# Patient Record
Sex: FEMALE | Race: WHITE | NOT HISPANIC OR LATINO | URBAN - METROPOLITAN AREA
[De-identification: names, ages, dates, MRNs, and addresses within clinical notes are randomized per-mention and may not be internally consistent; named-entity substitution may affect disease eponyms.]

---

## 2019-03-04 ENCOUNTER — APPOINTMENT (RX ONLY)
Dept: URBAN - METROPOLITAN AREA CLINIC 47 | Facility: CLINIC | Age: 69
Setting detail: DERMATOLOGY
End: 2019-03-04

## 2019-03-04 DIAGNOSIS — L82.1 OTHER SEBORRHEIC KERATOSIS: ICD-10-CM

## 2019-03-04 DIAGNOSIS — D22 MELANOCYTIC NEVI: ICD-10-CM

## 2019-03-04 DIAGNOSIS — L81.4 OTHER MELANIN HYPERPIGMENTATION: ICD-10-CM

## 2019-03-04 DIAGNOSIS — D18.0 HEMANGIOMA: ICD-10-CM

## 2019-03-04 PROBLEM — D22.9 MELANOCYTIC NEVI, UNSPECIFIED: Status: ACTIVE | Noted: 2019-03-04

## 2019-03-04 PROBLEM — D18.01 HEMANGIOMA OF SKIN AND SUBCUTANEOUS TISSUE: Status: ACTIVE | Noted: 2019-03-04

## 2019-03-04 PROCEDURE — ? COUNSELING

## 2019-03-04 PROCEDURE — 99213 OFFICE O/P EST LOW 20 MIN: CPT

## 2019-03-04 ASSESSMENT — LOCATION SIMPLE DESCRIPTION DERM
LOCATION SIMPLE: LEFT LOWER BACK
LOCATION SIMPLE: RIGHT UPPER BACK
LOCATION SIMPLE: RIGHT FOREHEAD
LOCATION SIMPLE: LEFT UPPER BACK

## 2019-03-04 ASSESSMENT — LOCATION DETAILED DESCRIPTION DERM
LOCATION DETAILED: LEFT INFERIOR UPPER BACK
LOCATION DETAILED: RIGHT INFERIOR UPPER BACK
LOCATION DETAILED: RIGHT FOREHEAD
LOCATION DETAILED: LEFT SUPERIOR LATERAL LOWER BACK

## 2019-03-04 ASSESSMENT — LOCATION ZONE DERM
LOCATION ZONE: FACE
LOCATION ZONE: TRUNK

## 2019-04-08 ENCOUNTER — APPOINTMENT (RX ONLY)
Dept: URBAN - METROPOLITAN AREA CLINIC 47 | Facility: CLINIC | Age: 69
Setting detail: DERMATOLOGY
End: 2019-04-08

## 2019-04-08 DIAGNOSIS — D485 NEOPLASM OF UNCERTAIN BEHAVIOR OF SKIN: ICD-10-CM

## 2019-04-08 PROBLEM — D48.5 NEOPLASM OF UNCERTAIN BEHAVIOR OF SKIN: Status: ACTIVE | Noted: 2019-04-08

## 2019-04-08 PROCEDURE — ? BIOPSY BY SHAVE METHOD

## 2019-04-08 PROCEDURE — 11102 TANGNTL BX SKIN SINGLE LES: CPT

## 2019-04-08 PROCEDURE — ? COUNSELING

## 2019-04-08 PROCEDURE — ? INVENTORY

## 2019-04-08 ASSESSMENT — LOCATION DETAILED DESCRIPTION DERM: LOCATION DETAILED: LEFT DISTAL DORSAL FOREARM

## 2019-04-08 ASSESSMENT — LOCATION SIMPLE DESCRIPTION DERM: LOCATION SIMPLE: LEFT FOREARM

## 2019-04-08 ASSESSMENT — LOCATION ZONE DERM: LOCATION ZONE: ARM

## 2019-04-08 NOTE — PROCEDURE: BIOPSY BY SHAVE METHOD
Was A Bandage Applied: Yes
Silver Nitrate Text: The wound bed was treated with silver nitrate after the biopsy was performed.
Hemostasis: Drysol
Dressing: bandage
Post-Care Instructions: I reviewed with the patient in detail post-care instructions. Patient is to keep the biopsy site dry overnight, and then apply bacitracin twice daily until healed. Patient may apply hydrogen peroxide soaks to remove any crusting.
Destruction After The Procedure: No
Biopsy Method: Dermablade
Curettage Text: The wound bed was treated with curettage after the biopsy was performed.
Notification Instructions: Patient will be notified of biopsy results. However, patient instructed to call the office if not contacted within 2 weeks.
Detail Level: Detailed
Consent: Written consent was obtained and risks were reviewed including but not limited to scarring, infection, bleeding, scabbing, incomplete removal, nerve damage and allergy to anesthesia.
Lab: -[id number]
Depth Of Biopsy: dermis
Electrodesiccation Text: The wound bed was treated with electrodesiccation after the biopsy was performed.
Anesthesia Type: 1% lidocaine with epinephrine
Lab Facility: [id number]
Cryotherapy Text: The wound bed was treated with cryotherapy after the biopsy was performed.
Size Of Lesion In Cm: 0.3
Wound Care: Petrolatum
Additional Anesthesia Volume In Cc (Will Not Render If 0): 0
Billing Type: Third-Party Bill
Biopsy Type: H and E
Electrodesiccation And Curettage Text: The wound bed was treated with electrodesiccation and curettage after the biopsy was performed.
Type Of Destruction Used: Curettage
Anesthesia Volume In Cc (Will Not Render If 0): 0.5

## 2019-05-03 ENCOUNTER — APPOINTMENT (RX ONLY)
Dept: URBAN - METROPOLITAN AREA CLINIC 47 | Facility: CLINIC | Age: 69
Setting detail: DERMATOLOGY
End: 2019-05-03

## 2019-05-03 DIAGNOSIS — L82.0 INFLAMED SEBORRHEIC KERATOSIS: ICD-10-CM

## 2019-05-03 PROBLEM — C44.629 SQUAMOUS CELL CARCINOMA OF SKIN OF LEFT UPPER LIMB, INCLUDING SHOULDER: Status: ACTIVE | Noted: 2019-05-03

## 2019-05-03 PROCEDURE — ? INVENTORY

## 2019-05-03 PROCEDURE — ? PRESCRIPTION

## 2019-05-03 PROCEDURE — ? LIQUID NITROGEN

## 2019-05-03 PROCEDURE — ? EXCISION

## 2019-05-03 PROCEDURE — 11603 EXC TR-EXT MAL+MARG 2.1-3 CM: CPT | Mod: 59

## 2019-05-03 PROCEDURE — ? COUNSELING

## 2019-05-03 PROCEDURE — 17110 DESTRUCTION B9 LES UP TO 14: CPT

## 2019-05-03 PROCEDURE — 12031 INTMD RPR S/A/T/EXT 2.5 CM/<: CPT | Mod: 59

## 2019-05-03 RX ORDER — MUPIROCIN 20 MG/G
OINTMENT TOPICAL
Qty: 1 | Refills: 3 | Status: ERX | COMMUNITY
Start: 2019-05-03

## 2019-05-03 RX ADMIN — MUPIROCIN: 20 OINTMENT TOPICAL at 00:00

## 2019-05-03 ASSESSMENT — LOCATION DETAILED DESCRIPTION DERM: LOCATION DETAILED: RIGHT DISTAL PRETIBIAL REGION

## 2019-05-03 ASSESSMENT — LOCATION SIMPLE DESCRIPTION DERM: LOCATION SIMPLE: RIGHT PRETIBIAL REGION

## 2019-05-03 ASSESSMENT — LOCATION ZONE DERM: LOCATION ZONE: LEG

## 2019-05-17 ENCOUNTER — APPOINTMENT (RX ONLY)
Dept: URBAN - METROPOLITAN AREA CLINIC 47 | Facility: CLINIC | Age: 69
Setting detail: DERMATOLOGY
End: 2019-05-17

## 2019-05-17 DIAGNOSIS — D485 NEOPLASM OF UNCERTAIN BEHAVIOR OF SKIN: ICD-10-CM

## 2019-05-17 DIAGNOSIS — Z48.02 ENCOUNTER FOR REMOVAL OF SUTURES: ICD-10-CM

## 2019-05-17 PROBLEM — D48.5 NEOPLASM OF UNCERTAIN BEHAVIOR OF SKIN: Status: ACTIVE | Noted: 2019-05-17

## 2019-05-17 PROCEDURE — ? BIOPSY BY SHAVE METHOD

## 2019-05-17 PROCEDURE — ? COUNSELING

## 2019-05-17 PROCEDURE — ? SUTURE REMOVAL (GLOBAL PERIOD)

## 2019-05-17 PROCEDURE — 99024 POSTOP FOLLOW-UP VISIT: CPT

## 2019-05-17 PROCEDURE — ? INVENTORY

## 2019-05-17 PROCEDURE — 11102 TANGNTL BX SKIN SINGLE LES: CPT

## 2019-05-17 ASSESSMENT — LOCATION DETAILED DESCRIPTION DERM
LOCATION DETAILED: LEFT DISTAL DORSAL FOREARM
LOCATION DETAILED: RIGHT PROXIMAL PRETIBIAL REGION

## 2019-05-17 ASSESSMENT — LOCATION ZONE DERM
LOCATION ZONE: ARM
LOCATION ZONE: LEG

## 2019-05-17 ASSESSMENT — LOCATION SIMPLE DESCRIPTION DERM
LOCATION SIMPLE: LEFT FOREARM
LOCATION SIMPLE: RIGHT PRETIBIAL REGION

## 2019-05-17 NOTE — PROCEDURE: SUTURE REMOVAL (GLOBAL PERIOD)
Detail Level: Detailed
Add 09158 Cpt? (Important Note: In 2017 The Use Of 98899 Is Being Tracked By Cms To Determine Future Global Period Reimbursement For Global Periods): yes

## 2019-05-17 NOTE — PROCEDURE: BIOPSY BY SHAVE METHOD
Dressing: bandage
Anesthesia Volume In Cc (Will Not Render If 0): 0.5
Was A Bandage Applied: Yes
Post-Care Instructions: I reviewed with the patient in detail post-care instructions. Patient is to keep the biopsy site dry overnight, and then apply bacitracin twice daily until healed. Patient may apply hydrogen peroxide soaks to remove any crusting.
Curettage Text: The wound bed was treated with curettage after the biopsy was performed.
Notification Instructions: Patient will be notified of biopsy results. However, patient instructed to call the office if not contacted within 2 weeks.
Biopsy Method: Dermablade
Hemostasis: Drysol
Silver Nitrate Text: The wound bed was treated with silver nitrate after the biopsy was performed.
Lab: -[id number]
Detail Level: Detailed
Bill For Surgical Tray: no
Size Of Lesion In Cm: 0.4
Anesthesia Type: 1% lidocaine with epinephrine
Cryotherapy Text: The wound bed was treated with cryotherapy after the biopsy was performed.
Consent: Written consent was obtained and risks were reviewed including but not limited to scarring, infection, bleeding, scabbing, incomplete removal, nerve damage and allergy to anesthesia.
Wound Care: Petrolatum
Lab Facility: 3
Additional Anesthesia Volume In Cc (Will Not Render If 0): 0
Biopsy Type: H and E
Depth Of Biopsy: dermis
Electrodesiccation Text: The wound bed was treated with electrodesiccation after the biopsy was performed.
Billing Type: Third-Party Bill
Electrodesiccation And Curettage Text: The wound bed was treated with electrodesiccation and curettage after the biopsy was performed.
Type Of Destruction Used: Curettage

## 2019-06-17 ENCOUNTER — APPOINTMENT (RX ONLY)
Dept: URBAN - METROPOLITAN AREA CLINIC 47 | Facility: CLINIC | Age: 69
Setting detail: DERMATOLOGY
End: 2019-06-17

## 2019-06-17 DIAGNOSIS — Z85.828 PERSONAL HISTORY OF OTHER MALIGNANT NEOPLASM OF SKIN: ICD-10-CM

## 2019-06-17 DIAGNOSIS — B07.8 OTHER VIRAL WARTS: ICD-10-CM

## 2019-06-17 PROCEDURE — 99213 OFFICE O/P EST LOW 20 MIN: CPT | Mod: 25

## 2019-06-17 PROCEDURE — ? COUNSELING

## 2019-06-17 PROCEDURE — ? INVENTORY

## 2019-06-17 PROCEDURE — ? LIQUID NITROGEN

## 2019-06-17 PROCEDURE — 17110 DESTRUCTION B9 LES UP TO 14: CPT

## 2019-06-17 ASSESSMENT — LOCATION SIMPLE DESCRIPTION DERM
LOCATION SIMPLE: LEFT FOREARM
LOCATION SIMPLE: RIGHT PRETIBIAL REGION

## 2019-06-17 ASSESSMENT — LOCATION ZONE DERM
LOCATION ZONE: ARM
LOCATION ZONE: LEG

## 2019-06-17 ASSESSMENT — LOCATION DETAILED DESCRIPTION DERM
LOCATION DETAILED: RIGHT PROXIMAL PRETIBIAL REGION
LOCATION DETAILED: LEFT DISTAL RADIAL DORSAL FOREARM

## 2019-06-17 NOTE — PROCEDURE: LIQUID NITROGEN
Medical Necessity Clause: This procedure was medically necessary because the lesions that were treated were:
Add 52 Modifier (Optional): no
Medical Necessity Information: It is in your best interest to select a reason for this procedure from the list below. All of these items fulfill various CMS LCD requirements except the new and changing color options.
Detail Level: Zone
Consent: The patient's consent was obtained including but not limited to risks of crusting, scabbing, blistering, scarring, darker or lighter pigmentary change, recurrence, incomplete removal and infection.
Post-Care Instructions: I reviewed with the patient in detail post-care instructions. Patient is to wear sunprotection, and avoid picking at any of the treated lesions. Pt may apply Vaseline to crusted or scabbing areas.

## 2019-07-24 ENCOUNTER — APPOINTMENT (RX ONLY)
Dept: URBAN - METROPOLITAN AREA CLINIC 47 | Facility: CLINIC | Age: 69
Setting detail: DERMATOLOGY
End: 2019-07-24

## 2019-07-24 DIAGNOSIS — B07.8 OTHER VIRAL WARTS: ICD-10-CM | Status: RESOLVED

## 2019-07-24 DIAGNOSIS — Z85.828 PERSONAL HISTORY OF OTHER MALIGNANT NEOPLASM OF SKIN: ICD-10-CM

## 2019-07-24 PROCEDURE — ? INVENTORY

## 2019-07-24 PROCEDURE — ? COUNSELING

## 2019-07-24 PROCEDURE — 99213 OFFICE O/P EST LOW 20 MIN: CPT

## 2019-07-24 ASSESSMENT — LOCATION DETAILED DESCRIPTION DERM: LOCATION DETAILED: LEFT DISTAL RADIAL DORSAL FOREARM

## 2019-07-24 ASSESSMENT — LOCATION ZONE DERM: LOCATION ZONE: ARM

## 2019-07-24 ASSESSMENT — LOCATION SIMPLE DESCRIPTION DERM: LOCATION SIMPLE: LEFT FOREARM

## 2019-09-04 ENCOUNTER — APPOINTMENT (RX ONLY)
Dept: URBAN - METROPOLITAN AREA CLINIC 47 | Facility: CLINIC | Age: 69
Setting detail: DERMATOLOGY
End: 2019-09-04

## 2019-09-04 DIAGNOSIS — Z85.828 PERSONAL HISTORY OF OTHER MALIGNANT NEOPLASM OF SKIN: ICD-10-CM

## 2019-09-04 DIAGNOSIS — B07.8 OTHER VIRAL WARTS: ICD-10-CM | Status: RESOLVED

## 2019-09-04 PROCEDURE — ? COUNSELING

## 2019-09-04 PROCEDURE — 99213 OFFICE O/P EST LOW 20 MIN: CPT

## 2019-09-04 PROCEDURE — ? INVENTORY

## 2019-09-04 ASSESSMENT — LOCATION ZONE DERM: LOCATION ZONE: ARM

## 2019-09-04 ASSESSMENT — LOCATION SIMPLE DESCRIPTION DERM: LOCATION SIMPLE: LEFT FOREARM

## 2019-09-04 ASSESSMENT — LOCATION DETAILED DESCRIPTION DERM: LOCATION DETAILED: LEFT DISTAL RADIAL DORSAL FOREARM

## 2020-01-22 ENCOUNTER — APPOINTMENT (RX ONLY)
Dept: URBAN - METROPOLITAN AREA CLINIC 47 | Facility: CLINIC | Age: 70
Setting detail: DERMATOLOGY
End: 2020-01-22

## 2020-01-22 DIAGNOSIS — D18.0 HEMANGIOMA: ICD-10-CM

## 2020-01-22 DIAGNOSIS — Z85.828 PERSONAL HISTORY OF OTHER MALIGNANT NEOPLASM OF SKIN: ICD-10-CM

## 2020-01-22 DIAGNOSIS — L57.0 ACTINIC KERATOSIS: ICD-10-CM

## 2020-01-22 DIAGNOSIS — L30.9 DERMATITIS, UNSPECIFIED: ICD-10-CM

## 2020-01-22 PROBLEM — D18.01 HEMANGIOMA OF SKIN AND SUBCUTANEOUS TISSUE: Status: ACTIVE | Noted: 2020-01-22

## 2020-01-22 PROCEDURE — ? PRESCRIPTION

## 2020-01-22 PROCEDURE — 99213 OFFICE O/P EST LOW 20 MIN: CPT | Mod: 25

## 2020-01-22 PROCEDURE — ? COUNSELING

## 2020-01-22 PROCEDURE — ? LIQUID NITROGEN

## 2020-01-22 PROCEDURE — ? COUNSELING: TOPICAL STEROIDS

## 2020-01-22 PROCEDURE — ? FULL BODY SKIN EXAM - DECLINED

## 2020-01-22 PROCEDURE — 17000 DESTRUCT PREMALG LESION: CPT

## 2020-01-22 RX ORDER — FLUOCINONIDE 0.5 MG/G
CREAM TOPICAL BID PRN
Qty: 1 | Refills: 1 | Status: ERX | COMMUNITY
Start: 2020-01-22

## 2020-01-22 RX ADMIN — FLUOCINONIDE: 0.5 CREAM TOPICAL at 00:00

## 2020-01-22 ASSESSMENT — LOCATION SIMPLE DESCRIPTION DERM
LOCATION SIMPLE: RIGHT FOREHEAD
LOCATION SIMPLE: LEFT FOREARM

## 2020-01-22 ASSESSMENT — LOCATION DETAILED DESCRIPTION DERM
LOCATION DETAILED: LEFT PROXIMAL DORSAL FOREARM
LOCATION DETAILED: RIGHT SUPERIOR FOREHEAD
LOCATION DETAILED: LEFT VENTRAL PROXIMAL FOREARM
LOCATION DETAILED: LEFT DISTAL RADIAL DORSAL FOREARM

## 2020-01-22 ASSESSMENT — LOCATION ZONE DERM
LOCATION ZONE: FACE
LOCATION ZONE: ARM

## 2020-01-22 NOTE — PROCEDURE: LIQUID NITROGEN
Render Post-Care Instructions In Note?: no
Consent: The patient's consent was obtained including but not limited to risks of crusting, scabbing, blistering, scarring, darker or lighter pigmentary change, recurrence, incomplete removal and infection.
Detail Level: Zone
Total Number Of Aks Treated: 1
Duration Of Freeze Thaw-Cycle (Seconds): 0
Post-Care Instructions: I reviewed with the patient in detail post-care instructions. Patient is to wear sunprotection, and avoid picking at any of the treated lesions. Pt may apply Vaseline to crusted or scabbing areas.

## 2020-07-22 ENCOUNTER — APPOINTMENT (RX ONLY)
Dept: URBAN - METROPOLITAN AREA CLINIC 47 | Facility: CLINIC | Age: 70
Setting detail: DERMATOLOGY
End: 2020-07-22

## 2020-07-22 DIAGNOSIS — L73.9 FOLLICULAR DISORDER, UNSPECIFIED: ICD-10-CM

## 2020-07-22 DIAGNOSIS — D18.0 HEMANGIOMA: ICD-10-CM

## 2020-07-22 DIAGNOSIS — L82.1 OTHER SEBORRHEIC KERATOSIS: ICD-10-CM

## 2020-07-22 DIAGNOSIS — Z85.828 PERSONAL HISTORY OF OTHER MALIGNANT NEOPLASM OF SKIN: ICD-10-CM

## 2020-07-22 DIAGNOSIS — L738 OTHER SPECIFIED DISEASES OF HAIR AND HAIR FOLLICLES: ICD-10-CM

## 2020-07-22 DIAGNOSIS — L663 OTHER SPECIFIED DISEASES OF HAIR AND HAIR FOLLICLES: ICD-10-CM

## 2020-07-22 DIAGNOSIS — L82.0 INFLAMED SEBORRHEIC KERATOSIS: ICD-10-CM

## 2020-07-22 DIAGNOSIS — L81.4 OTHER MELANIN HYPERPIGMENTATION: ICD-10-CM

## 2020-07-22 PROBLEM — D18.01 HEMANGIOMA OF SKIN AND SUBCUTANEOUS TISSUE: Status: ACTIVE | Noted: 2020-07-22

## 2020-07-22 PROBLEM — L02.92 FURUNCLE, UNSPECIFIED: Status: ACTIVE | Noted: 2020-07-22

## 2020-07-22 PROCEDURE — ? SUNSCREEN RECOMMENDATIONS

## 2020-07-22 PROCEDURE — 99213 OFFICE O/P EST LOW 20 MIN: CPT

## 2020-07-22 PROCEDURE — ? PRESCRIPTION

## 2020-07-22 PROCEDURE — ? COUNSELING

## 2020-07-22 PROCEDURE — ? FULL BODY SKIN EXAM - DECLINED

## 2020-07-22 RX ORDER — FLUOCINONIDE 0.5 MG/ML
SOLUTION TOPICAL
Qty: 1 | Refills: 3 | Status: ERX | COMMUNITY
Start: 2020-07-22

## 2020-07-22 RX ADMIN — FLUOCINONIDE: 0.5 SOLUTION TOPICAL at 00:00

## 2020-07-22 ASSESSMENT — LOCATION DETAILED DESCRIPTION DERM
LOCATION DETAILED: LEFT DISTAL RADIAL DORSAL FOREARM
LOCATION DETAILED: MIDDLE STERNUM
LOCATION DETAILED: LEFT INFERIOR LATERAL MIDBACK
LOCATION DETAILED: LEFT MEDIAL UPPER BACK

## 2020-07-22 ASSESSMENT — LOCATION SIMPLE DESCRIPTION DERM
LOCATION SIMPLE: LEFT FOREARM
LOCATION SIMPLE: LEFT LOWER BACK
LOCATION SIMPLE: LEFT UPPER BACK
LOCATION SIMPLE: CHEST

## 2020-07-22 ASSESSMENT — LOCATION ZONE DERM
LOCATION ZONE: TRUNK
LOCATION ZONE: ARM

## 2021-01-25 ENCOUNTER — APPOINTMENT (RX ONLY)
Dept: URBAN - METROPOLITAN AREA CLINIC 47 | Facility: CLINIC | Age: 71
Setting detail: DERMATOLOGY
End: 2021-01-25

## 2021-01-25 DIAGNOSIS — L73.9 FOLLICULAR DISORDER, UNSPECIFIED: ICD-10-CM | Status: IMPROVED

## 2021-01-25 DIAGNOSIS — L663 OTHER SPECIFIED DISEASES OF HAIR AND HAIR FOLLICLES: ICD-10-CM | Status: IMPROVED

## 2021-01-25 DIAGNOSIS — Z85.828 PERSONAL HISTORY OF OTHER MALIGNANT NEOPLASM OF SKIN: ICD-10-CM | Status: RESOLVED

## 2021-01-25 DIAGNOSIS — L82.0 INFLAMED SEBORRHEIC KERATOSIS: ICD-10-CM

## 2021-01-25 DIAGNOSIS — L738 OTHER SPECIFIED DISEASES OF HAIR AND HAIR FOLLICLES: ICD-10-CM | Status: IMPROVED

## 2021-01-25 DIAGNOSIS — L81.4 OTHER MELANIN HYPERPIGMENTATION: ICD-10-CM

## 2021-01-25 PROBLEM — L02.92 FURUNCLE, UNSPECIFIED: Status: ACTIVE | Noted: 2021-01-25

## 2021-01-25 PROCEDURE — ? SUNSCREEN RECOMMENDATIONS

## 2021-01-25 PROCEDURE — ? PRESCRIPTION MEDICATION MANAGEMENT

## 2021-01-25 PROCEDURE — ? COUNSELING

## 2021-01-25 PROCEDURE — ? LIQUID NITROGEN

## 2021-01-25 PROCEDURE — 99214 OFFICE O/P EST MOD 30 MIN: CPT | Mod: 25

## 2021-01-25 PROCEDURE — ? FULL BODY SKIN EXAM - DECLINED

## 2021-01-25 PROCEDURE — 17110 DESTRUCTION B9 LES UP TO 14: CPT

## 2021-01-25 ASSESSMENT — LOCATION DETAILED DESCRIPTION DERM
LOCATION DETAILED: LEFT SUPERIOR LATERAL MIDBACK
LOCATION DETAILED: LEFT DISTAL RADIAL DORSAL FOREARM
LOCATION DETAILED: MIDDLE STERNUM

## 2021-01-25 ASSESSMENT — LOCATION ZONE DERM
LOCATION ZONE: TRUNK
LOCATION ZONE: ARM

## 2021-01-25 ASSESSMENT — LOCATION SIMPLE DESCRIPTION DERM
LOCATION SIMPLE: CHEST
LOCATION SIMPLE: LEFT LOWER BACK
LOCATION SIMPLE: LEFT FOREARM

## 2021-01-25 NOTE — PROCEDURE: LIQUID NITROGEN
Render In Bullet Format When Appropriate: No
Include Z78.9 (Other Specified Conditions Influencing Health Status) As An Associated Diagnosis?: Yes
Consent: The patient's consent was obtained including but not limited to risks of crusting, scabbing, blistering, scarring, darker or lighter pigmentary change, recurrence, incomplete removal and infection.
Detail Level: Zone
Total Number Of Lesions Treated: 5
Post-Care Instructions: I reviewed with the patient in detail post-care instructions. Patient is to wear sunprotection, and avoid picking at any of the treated lesions. Pt may apply Vaseline to crusted or scabbing areas.
Medical Necessity Information: It is in your best interest to select a reason for this procedure from the list below. All of these items fulfill various CMS LCD requirements except the new and changing color options.
Duration Of Freeze Thaw-Cycle (Seconds): 0
Medical Necessity Clause: This procedure was medically necessary because the lesions that were treated were:

## 2021-01-25 NOTE — PROCEDURE: MIPS QUALITY
Fax received from [facility]  Test results for Mammo & Dexa dated 1/6/20    Placed in [facility] box for review
Detail Level: Detailed
Quality 226: Preventive Care And Screening: Tobacco Use: Screening And Cessation Intervention: Patient screened for tobacco use and is an ex/non-smoker
Quality 130: Documentation Of Current Medications In The Medical Record: Current Medications Documented
Quality 431: Preventive Care And Screening: Unhealthy Alcohol Use - Screening: Patient screened for unhealthy alcohol use using a single question and scores less than 2 times per year

## 2021-07-26 ENCOUNTER — APPOINTMENT (RX ONLY)
Dept: URBAN - METROPOLITAN AREA CLINIC 47 | Facility: CLINIC | Age: 71
Setting detail: DERMATOLOGY
End: 2021-07-26

## 2021-07-26 DIAGNOSIS — D22 MELANOCYTIC NEVI: ICD-10-CM

## 2021-07-26 DIAGNOSIS — D18.0 HEMANGIOMA: ICD-10-CM

## 2021-07-26 DIAGNOSIS — L81.4 OTHER MELANIN HYPERPIGMENTATION: ICD-10-CM

## 2021-07-26 DIAGNOSIS — Z85.828 PERSONAL HISTORY OF OTHER MALIGNANT NEOPLASM OF SKIN: ICD-10-CM | Status: RESOLVED

## 2021-07-26 PROBLEM — D18.01 HEMANGIOMA OF SKIN AND SUBCUTANEOUS TISSUE: Status: ACTIVE | Noted: 2021-07-26

## 2021-07-26 PROBLEM — D22.5 MELANOCYTIC NEVI OF TRUNK: Status: ACTIVE | Noted: 2021-07-26

## 2021-07-26 PROCEDURE — 99213 OFFICE O/P EST LOW 20 MIN: CPT

## 2021-07-26 PROCEDURE — ? COUNSELING

## 2021-07-26 PROCEDURE — ? SUNSCREEN RECOMMENDATIONS

## 2021-07-26 PROCEDURE — ? FULL BODY SKIN EXAM - DECLINED

## 2021-07-26 ASSESSMENT — LOCATION DETAILED DESCRIPTION DERM
LOCATION DETAILED: RIGHT LATERAL ABDOMEN
LOCATION DETAILED: LEFT RIB CAGE
LOCATION DETAILED: MIDDLE STERNUM
LOCATION DETAILED: LEFT DISTAL RADIAL DORSAL FOREARM

## 2021-07-26 ASSESSMENT — LOCATION ZONE DERM
LOCATION ZONE: ARM
LOCATION ZONE: TRUNK

## 2021-07-26 ASSESSMENT — LOCATION SIMPLE DESCRIPTION DERM
LOCATION SIMPLE: CHEST
LOCATION SIMPLE: ABDOMEN
LOCATION SIMPLE: LEFT FOREARM

## 2023-01-19 ENCOUNTER — APPOINTMENT (RX ONLY)
Dept: URBAN - METROPOLITAN AREA CLINIC 47 | Facility: CLINIC | Age: 73
Setting detail: DERMATOLOGY
End: 2023-01-19

## 2023-01-19 DIAGNOSIS — L57.0 ACTINIC KERATOSIS: ICD-10-CM

## 2023-01-19 DIAGNOSIS — L81.4 OTHER MELANIN HYPERPIGMENTATION: ICD-10-CM

## 2023-01-19 DIAGNOSIS — Z85.828 PERSONAL HISTORY OF OTHER MALIGNANT NEOPLASM OF SKIN: ICD-10-CM

## 2023-01-19 DIAGNOSIS — L82.1 OTHER SEBORRHEIC KERATOSIS: ICD-10-CM

## 2023-01-19 PROCEDURE — 99213 OFFICE O/P EST LOW 20 MIN: CPT | Mod: 25

## 2023-01-19 PROCEDURE — ? SUNSCREEN RECOMMENDATIONS

## 2023-01-19 PROCEDURE — ? LIQUID NITROGEN

## 2023-01-19 PROCEDURE — ? FULL BODY SKIN EXAM - DECLINED

## 2023-01-19 PROCEDURE — 17000 DESTRUCT PREMALG LESION: CPT

## 2023-01-19 PROCEDURE — ? COUNSELING

## 2023-01-19 PROCEDURE — 17003 DESTRUCT PREMALG LES 2-14: CPT

## 2023-01-19 ASSESSMENT — LOCATION SIMPLE DESCRIPTION DERM
LOCATION SIMPLE: LEFT CHEEK
LOCATION SIMPLE: LEFT SHOULDER
LOCATION SIMPLE: LEFT FOREARM
LOCATION SIMPLE: UPPER BACK

## 2023-01-19 ASSESSMENT — LOCATION DETAILED DESCRIPTION DERM
LOCATION DETAILED: LEFT ANTERIOR SHOULDER
LOCATION DETAILED: LEFT CENTRAL MALAR CHEEK
LOCATION DETAILED: INFERIOR THORACIC SPINE
LOCATION DETAILED: LEFT DISTAL RADIAL DORSAL FOREARM
LOCATION DETAILED: LEFT LATERAL MALAR CHEEK

## 2023-01-19 ASSESSMENT — LOCATION ZONE DERM
LOCATION ZONE: FACE
LOCATION ZONE: TRUNK
LOCATION ZONE: ARM

## 2023-04-19 ENCOUNTER — APPOINTMENT (RX ONLY)
Dept: URBAN - METROPOLITAN AREA CLINIC 47 | Facility: CLINIC | Age: 73
Setting detail: DERMATOLOGY
End: 2023-04-19

## 2023-04-19 DIAGNOSIS — L82.0 INFLAMED SEBORRHEIC KERATOSIS: ICD-10-CM

## 2023-04-19 DIAGNOSIS — L57.0 ACTINIC KERATOSIS: ICD-10-CM | Status: RESOLVING

## 2023-04-19 PROCEDURE — ? LIQUID NITROGEN

## 2023-04-19 PROCEDURE — 99212 OFFICE O/P EST SF 10 MIN: CPT | Mod: 25

## 2023-04-19 PROCEDURE — 17110 DESTRUCTION B9 LES UP TO 14: CPT

## 2023-04-19 PROCEDURE — ? MEDICARE ABN

## 2023-04-19 PROCEDURE — ? COUNSELING

## 2023-04-19 PROCEDURE — ? FULL BODY SKIN EXAM - DECLINED

## 2023-04-19 ASSESSMENT — LOCATION DETAILED DESCRIPTION DERM
LOCATION DETAILED: LEFT DISTAL POSTERIOR UPPER ARM
LOCATION DETAILED: LEFT PROXIMAL DORSAL FOREARM

## 2023-04-19 ASSESSMENT — LOCATION ZONE DERM: LOCATION ZONE: ARM

## 2023-04-19 ASSESSMENT — LOCATION SIMPLE DESCRIPTION DERM
LOCATION SIMPLE: LEFT FOREARM
LOCATION SIMPLE: LEFT POSTERIOR UPPER ARM

## 2023-09-28 ENCOUNTER — APPOINTMENT (RX ONLY)
Dept: URBAN - METROPOLITAN AREA CLINIC 47 | Facility: CLINIC | Age: 73
Setting detail: DERMATOLOGY
End: 2023-09-28

## 2023-09-28 DIAGNOSIS — L57.0 ACTINIC KERATOSIS: ICD-10-CM | Status: INADEQUATELY CONTROLLED

## 2023-09-28 PROCEDURE — ? LIQUID NITROGEN

## 2023-09-28 PROCEDURE — ? MEDICARE ABN

## 2023-09-28 PROCEDURE — 17000 DESTRUCT PREMALG LESION: CPT

## 2023-09-28 PROCEDURE — 17003 DESTRUCT PREMALG LES 2-14: CPT

## 2023-09-28 PROCEDURE — ? COUNSELING

## 2023-09-28 ASSESSMENT — LOCATION DETAILED DESCRIPTION DERM
LOCATION DETAILED: LEFT CENTRAL MALAR CHEEK
LOCATION DETAILED: RIGHT CENTRAL MALAR CHEEK

## 2023-09-28 ASSESSMENT — LOCATION ZONE DERM: LOCATION ZONE: FACE

## 2023-09-28 ASSESSMENT — LOCATION SIMPLE DESCRIPTION DERM
LOCATION SIMPLE: RIGHT CHEEK
LOCATION SIMPLE: LEFT CHEEK

## 2023-09-28 NOTE — PROCEDURE: LIQUID NITROGEN
Consent: The patient's consent was obtained including but not limited to risks of crusting, scabbing, blistering, scarring, darker or lighter pigmentary change, recurrence, incomplete removal and infection.
Duration Of Freeze Thaw-Cycle (Seconds): 0
Render Note In Bullet Format When Appropriate: No
Show Applicator Variable?: Yes
Post-Care Instructions: I reviewed with the patient in detail post-care instructions. Patient is to wear sunprotection, and avoid picking at any of the treated lesions. Pt may apply Vaseline to crusted or scabbing areas.
Detail Level: Detailed

## 2023-09-28 NOTE — PROCEDURE: MEDICARE ABN
Detail Level: Detailed
Payment Option: Option 1: Bill Medicare, await for decision on payment.
Procedure (Limit To 20 Characters): LN2
Reason?: Additional Information
Cost Of Treatment Patient Responsible For Paying?: Medicare allowed amount
Reason?: non-covered service

## 2023-11-30 ENCOUNTER — APPOINTMENT (RX ONLY)
Dept: URBAN - METROPOLITAN AREA CLINIC 47 | Facility: CLINIC | Age: 73
Setting detail: DERMATOLOGY
End: 2023-11-30

## 2023-11-30 DIAGNOSIS — L57.0 ACTINIC KERATOSIS: ICD-10-CM | Status: RESOLVING

## 2023-11-30 PROCEDURE — 99212 OFFICE O/P EST SF 10 MIN: CPT

## 2023-11-30 PROCEDURE — ? FULL BODY SKIN EXAM - DECLINED

## 2023-11-30 PROCEDURE — ? COUNSELING

## 2024-05-29 ENCOUNTER — APPOINTMENT (RX ONLY)
Dept: URBAN - METROPOLITAN AREA CLINIC 47 | Facility: CLINIC | Age: 74
Setting detail: DERMATOLOGY
End: 2024-05-29

## 2024-05-29 DIAGNOSIS — L82.0 INFLAMED SEBORRHEIC KERATOSIS: ICD-10-CM | Status: INADEQUATELY CONTROLLED

## 2024-05-29 PROCEDURE — 17110 DESTRUCTION B9 LES UP TO 14: CPT

## 2024-05-29 PROCEDURE — ? LIQUID NITROGEN

## 2024-05-29 PROCEDURE — ? COUNSELING

## 2024-05-29 PROCEDURE — ? FULL BODY SKIN EXAM - DECLINED

## 2024-05-29 PROCEDURE — ? MEDICARE ABN

## 2024-05-29 ASSESSMENT — LOCATION DETAILED DESCRIPTION DERM: LOCATION DETAILED: RIGHT CAVUM CONCHA

## 2024-05-29 ASSESSMENT — LOCATION ZONE DERM: LOCATION ZONE: EAR

## 2024-05-29 ASSESSMENT — LOCATION SIMPLE DESCRIPTION DERM: LOCATION SIMPLE: RIGHT EAR

## 2024-05-29 NOTE — PROCEDURE: LIQUID NITROGEN
Render Note In Bullet Format When Appropriate: No
Show Applicator Variable?: Yes
Detail Level: Zone
Consent: The patient's consent was obtained including but not limited to risks of crusting, scabbing, blistering, scarring, darker or lighter pigmentary change, recurrence, incomplete removal and infection.
Post-Care Instructions: I reviewed with the patient in detail post-care instructions. Patient is to wear sunprotection, and avoid picking at any of the treated lesions. Pt may apply Vaseline to crusted or scabbing areas.
Spray Paint Text: The liquid nitrogen was applied to the skin utilizing a spray paint frosting technique.
Medical Necessity Clause: This procedure was medically necessary because the lesions that were treated were:
Medical Necessity Information: It is in your best interest to select a reason for this procedure from the list below. All of these items fulfill various CMS LCD requirements except the new and changing color options.

## 2024-07-10 ENCOUNTER — APPOINTMENT (RX ONLY)
Dept: URBAN - METROPOLITAN AREA CLINIC 47 | Facility: CLINIC | Age: 74
Setting detail: DERMATOLOGY
End: 2024-07-10

## 2024-07-10 DIAGNOSIS — L82.0 INFLAMED SEBORRHEIC KERATOSIS: ICD-10-CM | Status: INADEQUATELY CONTROLLED

## 2024-07-10 PROCEDURE — ? MEDICARE ABN

## 2024-07-10 PROCEDURE — ? COUNSELING

## 2024-07-10 PROCEDURE — ? FULL BODY SKIN EXAM - DECLINED

## 2024-07-10 PROCEDURE — ? LIQUID NITROGEN

## 2024-07-10 PROCEDURE — 17110 DESTRUCTION B9 LES UP TO 14: CPT

## 2024-07-10 ASSESSMENT — LOCATION ZONE DERM: LOCATION ZONE: EAR

## 2024-07-10 ASSESSMENT — LOCATION DETAILED DESCRIPTION DERM: LOCATION DETAILED: RIGHT CAVUM CONCHA

## 2024-07-10 ASSESSMENT — LOCATION SIMPLE DESCRIPTION DERM: LOCATION SIMPLE: RIGHT EAR

## 2024-07-26 NOTE — PROCEDURE: SUNSCREEN RECOMMENDATIONS
"Pt calling in to speak with Rae about possible \"intermittent leave\" for work that her ins takes. Pt states her job goes by a point system for missing days and has run out of her vacation time for previous doctor's appts and has 4 appts coming up that will make her points valid for losing her job. Pt states there would be forms Rae would need to fill out and she can obtain them as soon as Rae makes a decision.   Appts are the following days:  8/9, 8/12, 9/9, 10/14  "
Detail Level: Detailed
General Sunscreen Counseling: I recommended a broad spectrum sunscreen with a SPF of 30 or higher.  I explained that SPF 30 sunscreens block approximately 97 percent of the sun's harmful rays.  Sunscreens should be applied at least 15 minutes prior to expected sun exposure and then every 2 hours after that as long as sun exposure continues. If swimming or exercising sunscreen should be reapplied every 45 minutes to an hour after getting wet or sweating.  One ounce, or the equivalent of a shot glass full of sunscreen, is adequate to protect the skin not covered by a bathing suit. I also recommended a lip balm with a sunscreen as well. Sun protective clothing can be used in lieu of sunscreen but must be worn the entire time you are exposed to the sun's rays.ABCD,s and E of melanoma reviewed

## 2024-10-10 ENCOUNTER — APPOINTMENT (RX ONLY)
Dept: URBAN - METROPOLITAN AREA CLINIC 47 | Facility: CLINIC | Age: 74
Setting detail: DERMATOLOGY
End: 2024-10-10

## 2024-10-10 DIAGNOSIS — L82.0 INFLAMED SEBORRHEIC KERATOSIS: ICD-10-CM | Status: INADEQUATELY CONTROLLED

## 2024-10-10 DIAGNOSIS — D22 MELANOCYTIC NEVI: ICD-10-CM | Status: STABLE

## 2024-10-10 PROBLEM — D22.62 MELANOCYTIC NEVI OF LEFT UPPER LIMB, INCLUDING SHOULDER: Status: ACTIVE | Noted: 2024-10-10

## 2024-10-10 PROCEDURE — 99212 OFFICE O/P EST SF 10 MIN: CPT | Mod: 25

## 2024-10-10 PROCEDURE — ? COUNSELING

## 2024-10-10 PROCEDURE — 17110 DESTRUCTION B9 LES UP TO 14: CPT

## 2024-10-10 PROCEDURE — ? MEDICARE ABN

## 2024-10-10 PROCEDURE — ? LIQUID NITROGEN

## 2024-10-10 PROCEDURE — ? FULL BODY SKIN EXAM - DECLINED

## 2024-10-10 ASSESSMENT — LOCATION DETAILED DESCRIPTION DERM
LOCATION DETAILED: RIGHT PROXIMAL POSTERIOR UPPER ARM
LOCATION DETAILED: LEFT PROXIMAL POSTERIOR UPPER ARM
LOCATION DETAILED: RIGHT DISTAL POSTERIOR UPPER ARM

## 2024-10-10 ASSESSMENT — LOCATION SIMPLE DESCRIPTION DERM
LOCATION SIMPLE: RIGHT POSTERIOR UPPER ARM
LOCATION SIMPLE: LEFT POSTERIOR UPPER ARM

## 2024-10-10 ASSESSMENT — LOCATION ZONE DERM: LOCATION ZONE: ARM

## 2024-10-10 NOTE — PROCEDURE: MEDICARE ABN
Reason?: Additional Information
Payment Option: Option 1: Bill Medicare, await for decision on payment.
Procedure (Limit To 20 Characters): liquid nitrogen
Cost Of Treatment Patient Responsible For Paying?: Medicare allowed amount
Reason?: non-covered service
Detail Level: Detailed

## 2024-10-10 NOTE — PROCEDURE: LIQUID NITROGEN
Show Topical Anesthesia Variable?: Yes
Medical Necessity Information: It is in your best interest to select a reason for this procedure from the list below. All of these items fulfill various CMS LCD requirements except the new and changing color options.
Detail Level: Zone
Render Post-Care Instructions In Note?: no
Post-Care Instructions: I reviewed with the patient in detail post-care instructions. Patient is to wear sunprotection, and avoid picking at any of the treated lesions. Pt may apply Vaseline to crusted or scabbing areas.
Spray Paint Text: The liquid nitrogen was applied to the skin utilizing a spray paint frosting technique.
Medical Necessity Clause: This procedure was medically necessary because the lesions that were treated were:
Consent: The patient's consent was obtained including but not limited to risks of crusting, scabbing, blistering, scarring, darker or lighter pigmentary change, recurrence, incomplete removal and infection.

## 2025-01-16 NOTE — PROCEDURE: EXCISION
at home: Composite Graft Text: The defect edges were debeveled with a #15 scalpel blade.  Given the location of the defect, shape of the defect, the proximity to free margins and the fact the defect was full thickness a composite graft was deemed most appropriate.  The defect was outline and then transferred to the donor site.  A full thickness graft was then excised from the donor site. The graft was then placed in the primary defect, oriented appropriately and then sutured into place.  The secondary defect was then repaired using a primary closure.